# Patient Record
(demographics unavailable — no encounter records)

---

## 2025-04-02 NOTE — PHYSICAL EXAM
[Examination Of The Breasts] : a normal appearance [No Masses] : no breast masses were palpable [Labia Majora] : normal [Labia Minora] : normal [Normal] : normal [Absent] : absent [Uterine Adnexae] : absent [[email or web address]] : vaginal cuff well supported